# Patient Record
Sex: FEMALE | Race: ASIAN | NOT HISPANIC OR LATINO | URBAN - METROPOLITAN AREA
[De-identification: names, ages, dates, MRNs, and addresses within clinical notes are randomized per-mention and may not be internally consistent; named-entity substitution may affect disease eponyms.]

---

## 2019-03-24 ENCOUNTER — EMERGENCY (EMERGENCY)
Facility: HOSPITAL | Age: 49
LOS: 1 days | Discharge: HOME | End: 2019-03-24
Attending: EMERGENCY MEDICINE | Admitting: EMERGENCY MEDICINE
Payer: MEDICAID

## 2019-03-24 VITALS
RESPIRATION RATE: 18 BRPM | OXYGEN SATURATION: 100 % | DIASTOLIC BLOOD PRESSURE: 68 MMHG | HEART RATE: 88 BPM | TEMPERATURE: 98 F | SYSTOLIC BLOOD PRESSURE: 109 MMHG

## 2019-03-24 VITALS
TEMPERATURE: 98 F | DIASTOLIC BLOOD PRESSURE: 54 MMHG | OXYGEN SATURATION: 100 % | SYSTOLIC BLOOD PRESSURE: 95 MMHG | RESPIRATION RATE: 16 BRPM | HEART RATE: 100 BPM

## 2019-03-24 LAB
ALBUMIN SERPL ELPH-MCNC: 3.6 G/DL — SIGNIFICANT CHANGE UP (ref 3.3–5)
ALP SERPL-CCNC: 54 U/L — SIGNIFICANT CHANGE UP (ref 40–120)
ALT FLD-CCNC: 13 U/L — SIGNIFICANT CHANGE UP (ref 4–33)
ANION GAP SERPL CALC-SCNC: 14 MMO/L — SIGNIFICANT CHANGE UP (ref 7–14)
ANISOCYTOSIS BLD QL: SLIGHT — SIGNIFICANT CHANGE UP
APPEARANCE UR: CLEAR — SIGNIFICANT CHANGE UP
APTT BLD: 25.9 SEC — LOW (ref 27.5–36.3)
AST SERPL-CCNC: 18 U/L — SIGNIFICANT CHANGE UP (ref 4–32)
BACTERIA # UR AUTO: SIGNIFICANT CHANGE UP
BASOPHILS # BLD AUTO: 0.02 K/UL — SIGNIFICANT CHANGE UP (ref 0–0.2)
BASOPHILS NFR BLD AUTO: 0.1 % — SIGNIFICANT CHANGE UP (ref 0–2)
BILIRUB SERPL-MCNC: 0.3 MG/DL — SIGNIFICANT CHANGE UP (ref 0.2–1.2)
BILIRUB UR-MCNC: NEGATIVE — SIGNIFICANT CHANGE UP
BLOOD UR QL VISUAL: HIGH
BUN SERPL-MCNC: 12 MG/DL — SIGNIFICANT CHANGE UP (ref 7–23)
CALCIUM SERPL-MCNC: 8.9 MG/DL — SIGNIFICANT CHANGE UP (ref 8.4–10.5)
CHLORIDE SERPL-SCNC: 103 MMOL/L — SIGNIFICANT CHANGE UP (ref 98–107)
CO2 SERPL-SCNC: 23 MMOL/L — SIGNIFICANT CHANGE UP (ref 22–31)
COLOR SPEC: YELLOW — SIGNIFICANT CHANGE UP
CREAT SERPL-MCNC: 1.07 MG/DL — SIGNIFICANT CHANGE UP (ref 0.5–1.3)
DACRYOCYTES BLD QL SMEAR: SLIGHT — SIGNIFICANT CHANGE UP
EOSINOPHIL # BLD AUTO: 0.03 K/UL — SIGNIFICANT CHANGE UP (ref 0–0.5)
EOSINOPHIL NFR BLD AUTO: 0.2 % — SIGNIFICANT CHANGE UP (ref 0–6)
EPI CELLS # UR: SIGNIFICANT CHANGE UP
GLUCOSE SERPL-MCNC: 130 MG/DL — HIGH (ref 70–99)
GLUCOSE UR-MCNC: NEGATIVE — SIGNIFICANT CHANGE UP
HCT VFR BLD CALC: 41.2 % — SIGNIFICANT CHANGE UP (ref 34.5–45)
HGB BLD-MCNC: 12.3 G/DL — SIGNIFICANT CHANGE UP (ref 11.5–15.5)
HYPOCHROMIA BLD QL: SLIGHT — SIGNIFICANT CHANGE UP
IMM GRANULOCYTES NFR BLD AUTO: 0.5 % — SIGNIFICANT CHANGE UP (ref 0–1.5)
INR BLD: 1.06 — SIGNIFICANT CHANGE UP (ref 0.88–1.17)
KETONES UR-MCNC: NEGATIVE — SIGNIFICANT CHANGE UP
LEUKOCYTE ESTERASE UR-ACNC: SIGNIFICANT CHANGE UP
LYMPHOCYTES # BLD AUTO: 1.64 K/UL — SIGNIFICANT CHANGE UP (ref 1–3.3)
LYMPHOCYTES # BLD AUTO: 8.4 % — LOW (ref 13–44)
MCHC RBC-ENTMCNC: 20.8 PG — LOW (ref 27–34)
MCHC RBC-ENTMCNC: 29.9 % — LOW (ref 32–36)
MCV RBC AUTO: 69.6 FL — LOW (ref 80–100)
MICROCYTES BLD QL: SLIGHT — SIGNIFICANT CHANGE UP
MONOCYTES # BLD AUTO: 0.76 K/UL — SIGNIFICANT CHANGE UP (ref 0–0.9)
MONOCYTES NFR BLD AUTO: 3.9 % — SIGNIFICANT CHANGE UP (ref 2–14)
MUCOUS THREADS # UR AUTO: SIGNIFICANT CHANGE UP
NEUTROPHILS # BLD AUTO: 17.07 K/UL — HIGH (ref 1.8–7.4)
NEUTROPHILS NFR BLD AUTO: 86.9 % — HIGH (ref 43–77)
NITRITE UR-MCNC: NEGATIVE — SIGNIFICANT CHANGE UP
NRBC # FLD: 0 K/UL — LOW (ref 25–125)
OVALOCYTES BLD QL SMEAR: SLIGHT — SIGNIFICANT CHANGE UP
PH UR: 6 — SIGNIFICANT CHANGE UP (ref 5–8)
PLATELET # BLD AUTO: 333 K/UL — SIGNIFICANT CHANGE UP (ref 150–400)
PLATELET COUNT - ESTIMATE: NORMAL — SIGNIFICANT CHANGE UP
PMV BLD: 10.8 FL — SIGNIFICANT CHANGE UP (ref 7–13)
POIKILOCYTOSIS BLD QL AUTO: SLIGHT — SIGNIFICANT CHANGE UP
POTASSIUM SERPL-MCNC: 4 MMOL/L — SIGNIFICANT CHANGE UP (ref 3.5–5.3)
POTASSIUM SERPL-SCNC: 4 MMOL/L — SIGNIFICANT CHANGE UP (ref 3.5–5.3)
PROT SERPL-MCNC: 7.3 G/DL — SIGNIFICANT CHANGE UP (ref 6–8.3)
PROT UR-MCNC: 70 — SIGNIFICANT CHANGE UP
PROTHROM AB SERPL-ACNC: 12.1 SEC — SIGNIFICANT CHANGE UP (ref 9.8–13.1)
RBC # BLD: 5.92 M/UL — HIGH (ref 3.8–5.2)
RBC # FLD: 15.3 % — HIGH (ref 10.3–14.5)
RBC CASTS # UR COMP ASSIST: HIGH (ref 0–?)
SODIUM SERPL-SCNC: 140 MMOL/L — SIGNIFICANT CHANGE UP (ref 135–145)
SP GR SPEC: 1.03 — SIGNIFICANT CHANGE UP (ref 1–1.04)
TARGETS BLD QL SMEAR: SLIGHT — SIGNIFICANT CHANGE UP
TROPONIN T, HIGH SENSITIVITY: 6 NG/L — SIGNIFICANT CHANGE UP (ref ?–14)
TROPONIN T, HIGH SENSITIVITY: < 6 NG/L — SIGNIFICANT CHANGE UP (ref ?–14)
UROBILINOGEN FLD QL: NORMAL — SIGNIFICANT CHANGE UP
WBC # BLD: 19.62 K/UL — HIGH (ref 3.8–10.5)
WBC # FLD AUTO: 19.62 K/UL — HIGH (ref 3.8–10.5)
WBC UR QL: SIGNIFICANT CHANGE UP (ref 0–?)

## 2019-03-24 PROCEDURE — 93010 ELECTROCARDIOGRAM REPORT: CPT

## 2019-03-24 PROCEDURE — 99285 EMERGENCY DEPT VISIT HI MDM: CPT | Mod: 25

## 2019-03-24 PROCEDURE — 71046 X-RAY EXAM CHEST 2 VIEWS: CPT | Mod: 26

## 2019-03-24 RX ORDER — CEPHALEXIN 500 MG
500 CAPSULE ORAL ONCE
Qty: 0 | Refills: 0 | Status: COMPLETED | OUTPATIENT
Start: 2019-03-24 | End: 2019-03-24

## 2019-03-24 RX ORDER — SODIUM CHLORIDE 9 MG/ML
1000 INJECTION INTRAMUSCULAR; INTRAVENOUS; SUBCUTANEOUS ONCE
Qty: 0 | Refills: 0 | Status: COMPLETED | OUTPATIENT
Start: 2019-03-24 | End: 2019-03-24

## 2019-03-24 RX ORDER — FAMOTIDINE 10 MG/ML
20 INJECTION INTRAVENOUS ONCE
Qty: 0 | Refills: 0 | Status: COMPLETED | OUTPATIENT
Start: 2019-03-24 | End: 2019-03-24

## 2019-03-24 RX ADMIN — FAMOTIDINE 20 MILLIGRAM(S): 10 INJECTION INTRAVENOUS at 15:34

## 2019-03-24 RX ADMIN — Medication 500 MILLIGRAM(S): at 18:22

## 2019-03-24 RX ADMIN — SODIUM CHLORIDE 1000 MILLILITER(S): 9 INJECTION INTRAMUSCULAR; INTRAVENOUS; SUBCUTANEOUS at 15:34

## 2019-03-24 NOTE — ED PROVIDER NOTE - OBJECTIVE STATEMENT
47 yo Bah[place]ian F, nonsmoker with PMH of HTN, hx of allergic reaction to hair dye, BIBEMS to ER c/o syncope, allergic reaction to hair dye w/ rash and itchiness today. Pt states she was dyeing her hair today, feels like her head is bloating up, took Benadryl x2 and went to shower and wash her hair for 15-20min. Reports she came out the shower, felt blurry vision and passed out, unwitnessed. As per brother, pt's 13 yr daughter called them and when they arrived, called EMS. Pt admits having substernal chest pain for the past 2-3 months while walking, sharp, 5/10, intermittent, non-radiating, a/w sob. Admits chest pain while EMS arrived, currently pain free. Reports seen by PCP for same chest pain, prescribed with Aspirin, and cardiology referral, but haven't seen one yet. States she has redness in face, arms and legs w/ itchiness. Denies any fever, chills, headache, dizziness, neck pain, otalgia, blurry vision, diplopia, back pain, sob, abdominal pain, dysuria, leg swelling, hx of DVT/PE, or any other complaints. 47 yo [place] F, nonsmoker with PMH of HTN, hx of allergic reaction to hair dye, BIBEMS to ER c/o syncope, allergic reaction to hair dye w/ rash and itchiness today. Pt states she was dyeing her hair today, feels like her head is bloating up, took Benadryl x2 and went to shower and wash her hair for 15-20min standing. Reports she came out the hot shower, felt blurry vision and passed out, unwitnessed. As per brother, pt's 13 yr daughter called them and when they arrived, called EMS. Pt admits having substernal chest pain for the past 2-3 months while walking, sharp, 5/10, intermittent, non-radiating, a/w sob. Admits chest pain while EMS arrived, currently pain free. Reports seen by PCP for same chest pain, prescribed with Aspirin, and cardiology referral, but haven't seen one yet. States she has redness in face, arms and legs w/ itchiness. Denies any fever, chills, headache, dizziness, neck pain, otalgia, blurry vision, diplopia, back pain, sob, abdominal pain, dysuria, leg swelling, hx of DVT/PE, or any other complaints.

## 2019-03-24 NOTE — ED ADULT NURSE NOTE - OBJECTIVE STATEMENT
Pt received into intake A&Ox4, ambulatory C/O allergic reaction, syncopal episode and CP. Pt states she used new hair dye earlier this morning had pruritic rash to area applied took 50 mg benadryl; approximately 1 hour later had unwitnessed syncopal episode and found on floor by family in bedroom: when patient regained consciousness is now complaining of midsternal CP. Denies SOB, difficulty swallowing, fevers, chills, recent illness. No angioedema observed, airway patent, respirations even and unlabored, speaking in full sentences without difficulty. Md evaluated, VS as noted. 20 G IV placed to L AC, labs sent, medicated as ordered. comfort measures provided, family at bedside, will continue to monitor for safety and comfort.

## 2019-03-24 NOTE — ED PROVIDER NOTE - HEAD, MLM
Head is atraumatic. Head shape is symmetrical. no battles sign b/l. no raccoon signs b/l. No head tenderness b/l. no signs of trauma/injury

## 2019-03-24 NOTE — ED PROVIDER NOTE - CLINICAL SUMMARY MEDICAL DECISION MAKING FREE TEXT BOX
47 yo Azerbaij[place] F, nonsmoker with PMH of HTN, hx of allergic reaction to hair dye, BIBEMS to ER c/o syncope, allergic reaction to hair dye w/ rash and itchiness today. well appearing female p/w syncope after dyeing her hair, took Benadryl x2 prior to shower and came out w/ blurry vision prior to syncope, ?head trauma, unwitnessed fall, has chest pain x2-3 months, seen by PCP treated with Aspirin and cardiology referral. Perc neg. The patient's risk factors for ACS were reviewed as well as the EKG.  The CXR assists in r/o Pneumonia, Pneumothorax, Esophageal Tears.  The patient does not appear to have a Pulmonary Embolism based on the Wells Score and there is no apparent DVT.  There are no signs of Pericarditis, Endocarditis, or Myocarditis based on risk factor analysis.  There is no fever.  There does not appear to be an Aortic Dissection either based on history, physical exam, and signs. Likely vasovagal syncope ?a/w allergic reaction. Plan: ACS r/o, check trop, labs, CXR, give IVF, pepcid, and reassess. 49 yo [place] F, nonsmoker with PMH of HTN, hx of allergic reaction to hair dye, BIBEMS to ER c/o syncope, allergic reaction to hair dye w/ rash and itchiness today. well appearing female p/w syncope after dyeing her hair, took Benadryl x2 prior to shower and came out w/ blurry vision prior to syncope, ?head trauma, unwitnessed fall, has chest pain x2-3 months, seen by PCP treated with Aspirin and cardiology referral. Perc neg. The patient's risk factors for ACS were reviewed as well as the EKG.  The CXR assists in r/o Pneumonia, Pneumothorax, Esophageal Tears.  The patient does not appear to have a Pulmonary Embolism based on the Wells Score and there is no apparent DVT.  There are no signs of Pericarditis, Endocarditis, or Myocarditis based on risk factor analysis.  There is no fever.  There does not appear to be an Aortic Dissection either based on history, physical exam, and signs. Likely vasovagal syncope ?a/w allergic reaction, no focal neuro deficits, GCS of 15, no midline cervical spine tenderness. Plan: ACS r/o, check trop, labs, CXR, give IVF, pepcid, and reassess. 47 yo Turk[place] F, nonsmoker with PMH of HTN, hx of allergic reaction to hair dye, BIBEMS to ER c/o syncope, allergic reaction to hair dye w/ rash and itchiness today. well appearing female p/w syncope after dyeing her hair, took Benadryl x2 prior to shower and came out of hot shower w/ blurry vision prior to syncope, ?head trauma, unwitnessed fall, has chest pain x2-3 months, seen by PCP treated with Aspirin and cardiology referral. Perc neg. The patient's risk factors for ACS were reviewed as well as the EKG.  The CXR assists in r/o Pneumonia, Pneumothorax, Esophageal Tears.  The patient does not appear to have a Pulmonary Embolism based on the Wells Score and there is no apparent DVT.  There are no signs of Pericarditis, Endocarditis, or Myocarditis based on risk factor analysis.  There is no fever.  There does not appear to be an Aortic Dissection either based on history, physical exam, and signs. Likely vasovagal syncope, no focal neuro deficits, GCS of 15, no midline cervical spine tenderness. Plan: ACS r/o, check trop, labs, CXR, give IVF, pepcid, and reassess.

## 2019-03-24 NOTE — ED PROVIDER NOTE - ATTENDING CONTRIBUTION TO CARE
I performed a face to face bedside interview with patient regarding history of present illness, review of symptoms and past medical history. I completed an independent physical exam.  I have discussed patient's plan of care.   I agree with note as stated above, having amended the EMR as needed to reflect my findings. I have discussed the assessment and plan of care.  This includes during the time I functioned as the attending physician for this patient.  Attending Contribution to Care: agree with plan of PA. pt p/w syncopal episode after hair salon while reacting to hair dye. no significant head trauma. vss. ekg nsr. tropx2 neg. cxr neg. pt stable for d/c.

## 2019-03-24 NOTE — ED PROVIDER NOTE - NSFOLLOWUPINSTRUCTIONS_ED_ALL_ED_FT
Follow up with your PMD and cardiologist within 48-72 hours.  Show copies of your labs provided.  Recommend consult with an Allergist. Referral list provided.  Take Prednisone 40mg daily for 4 days, Pepcid 20mg 2x/day for 4 days, Benadryl 25mg every 8 hours for 4 days- caution drowsiness/do not drive. Worsening or new shortness of breath, tightness in your throat, swelling, chest pain, fever, chills, or ANY new concerning symptoms return to the Emergency Department.

## 2019-03-24 NOTE — ED ADULT TRIAGE NOTE - CHIEF COMPLAINT QUOTE
Pt brought in by EMS from home complaining of rash and itchiness after using hair dye. Pt states she took 2 benadryl PO. Pt denies SOB.

## 2019-03-24 NOTE — ED PROVIDER NOTE - PROGRESS NOTE DETAILS
PA JOSUÉ: Patient reassessed, sitting comfortably in bed in NAD, on cardiac monitor, denies any complaints. States feeling better, symptoms improved. Trop 6, repeat trop sent. UA shows small leuks, few bacteria, admits dysuria, will treat w/ Keflex. Will continue to monitor and reassess. PA JOSUÉ: trop <6, Discussed with attending, patient can be discharged home to f/u with PCP and cardiology. The patient was given verbal and written discharge instructions. Specifically, instructions when to return to the ED and when to seek follow-up from their pcp was discussed. Any specialty follow-up was discussed, including how to make an appointment.  Instructions were discussed in simple, plain language and was understood by the patient. The patient understands that should their symptoms worsen or any new symptoms arise, they should return to the ED immediately for further evaluation. All pt's questions were answered. Patient verbalizes understanding.

## 2019-03-25 RX ORDER — FAMOTIDINE 10 MG/ML
1 INJECTION INTRAVENOUS
Qty: 4 | Refills: 0 | OUTPATIENT
Start: 2019-03-25 | End: 2019-03-28

## 2019-03-26 LAB
BACTERIA UR CULT: SIGNIFICANT CHANGE UP
SPECIMEN SOURCE: SIGNIFICANT CHANGE UP